# Patient Record
Sex: MALE | Race: WHITE | NOT HISPANIC OR LATINO | Employment: STUDENT | URBAN - METROPOLITAN AREA
[De-identification: names, ages, dates, MRNs, and addresses within clinical notes are randomized per-mention and may not be internally consistent; named-entity substitution may affect disease eponyms.]

---
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
## 2025-06-16 ENCOUNTER — OFFICE VISIT (OUTPATIENT)
Dept: INTERNAL MEDICINE | Facility: CLINIC | Age: 20
End: 2025-06-16
Payer: COMMERCIAL

## 2025-06-16 ENCOUNTER — LAB VISIT (OUTPATIENT)
Dept: LAB | Facility: OTHER | Age: 20
End: 2025-06-16
Attending: INTERNAL MEDICINE
Payer: COMMERCIAL

## 2025-06-16 VITALS
BODY MASS INDEX: 21.91 KG/M2 | HEART RATE: 73 BPM | WEIGHT: 156.5 LBS | HEIGHT: 71 IN | OXYGEN SATURATION: 100 % | SYSTOLIC BLOOD PRESSURE: 142 MMHG | DIASTOLIC BLOOD PRESSURE: 80 MMHG

## 2025-06-16 DIAGNOSIS — R11.0 NAUSEA: ICD-10-CM

## 2025-06-16 DIAGNOSIS — R63.4 WEIGHT LOSS: ICD-10-CM

## 2025-06-16 DIAGNOSIS — R03.0 ELEVATED BLOOD PRESSURE READING: ICD-10-CM

## 2025-06-16 DIAGNOSIS — K29.70 GASTRITIS WITHOUT BLEEDING, UNSPECIFIED CHRONICITY, UNSPECIFIED GASTRITIS TYPE: ICD-10-CM

## 2025-06-16 DIAGNOSIS — R10.13 EPIGASTRIC PAIN: Primary | ICD-10-CM

## 2025-06-16 DIAGNOSIS — R11.14 BILIOUS VOMITING WITH NAUSEA: ICD-10-CM

## 2025-06-16 DIAGNOSIS — R10.13 EPIGASTRIC PAIN: ICD-10-CM

## 2025-06-16 LAB
ABSOLUTE EOSINOPHIL (OHS): 0.08 K/UL
ABSOLUTE MONOCYTE (OHS): 0.41 K/UL (ref 0.3–1)
ABSOLUTE NEUTROPHIL COUNT (OHS): 4.28 K/UL (ref 1.8–7.7)
ALBUMIN SERPL BCP-MCNC: 4.7 G/DL (ref 3.5–5.2)
ALP SERPL-CCNC: 94 UNIT/L (ref 40–150)
ALT SERPL W/O P-5'-P-CCNC: 16 UNIT/L (ref 10–44)
AMYLASE SERPL-CCNC: 44 UNIT/L (ref 20–110)
ANION GAP (OHS): 12 MMOL/L (ref 8–16)
AST SERPL-CCNC: 17 UNIT/L (ref 11–45)
BASOPHILS # BLD AUTO: 0.04 K/UL
BASOPHILS NFR BLD AUTO: 0.7 %
BILIRUB SERPL-MCNC: 0.8 MG/DL (ref 0.1–1)
BUN SERPL-MCNC: 14 MG/DL (ref 6–20)
CALCIUM SERPL-MCNC: 10 MG/DL (ref 8.7–10.5)
CHLORIDE SERPL-SCNC: 102 MMOL/L (ref 95–110)
CO2 SERPL-SCNC: 24 MMOL/L (ref 23–29)
CREAT SERPL-MCNC: 1.1 MG/DL (ref 0.5–1.4)
CRP SERPL-MCNC: <0.1 MG/L
ERYTHROCYTE [DISTWIDTH] IN BLOOD BY AUTOMATED COUNT: 11.7 % (ref 11.5–14.5)
GFR SERPLBLD CREATININE-BSD FMLA CKD-EPI: >60 ML/MIN/1.73/M2
GLUCOSE SERPL-MCNC: 95 MG/DL (ref 70–110)
HCT VFR BLD AUTO: 48.3 % (ref 40–54)
HGB BLD-MCNC: 16.7 GM/DL (ref 14–18)
IMM GRANULOCYTES # BLD AUTO: 0.01 K/UL (ref 0–0.04)
IMM GRANULOCYTES NFR BLD AUTO: 0.2 % (ref 0–0.5)
LIPASE SERPL-CCNC: 14 U/L (ref 4–60)
LYMPHOCYTES # BLD AUTO: 1.16 K/UL (ref 1–4.8)
MCH RBC QN AUTO: 29.9 PG (ref 27–31)
MCHC RBC AUTO-ENTMCNC: 34.6 G/DL (ref 32–36)
MCV RBC AUTO: 87 FL (ref 82–98)
NUCLEATED RBC (/100WBC) (OHS): 0 /100 WBC
PLATELET # BLD AUTO: 273 K/UL (ref 150–450)
PMV BLD AUTO: 10.5 FL (ref 9.2–12.9)
POTASSIUM SERPL-SCNC: 4.4 MMOL/L (ref 3.5–5.1)
PROT SERPL-MCNC: 8.3 GM/DL (ref 6–8.4)
RBC # BLD AUTO: 5.58 M/UL (ref 4.6–6.2)
RELATIVE EOSINOPHIL (OHS): 1.3 %
RELATIVE LYMPHOCYTE (OHS): 19.4 % (ref 18–48)
RELATIVE MONOCYTE (OHS): 6.9 % (ref 4–15)
RELATIVE NEUTROPHIL (OHS): 71.5 % (ref 38–73)
SODIUM SERPL-SCNC: 138 MMOL/L (ref 136–145)
T4 FREE SERPL-MCNC: 1.25 NG/DL (ref 0.71–1.51)
WBC # BLD AUTO: 5.98 K/UL (ref 3.9–12.7)

## 2025-06-16 PROCEDURE — 80053 COMPREHEN METABOLIC PANEL: CPT

## 2025-06-16 PROCEDURE — 84439 ASSAY OF FREE THYROXINE: CPT

## 2025-06-16 PROCEDURE — 1160F RVW MEDS BY RX/DR IN RCRD: CPT | Mod: CPTII,S$GLB,, | Performed by: INTERNAL MEDICINE

## 2025-06-16 PROCEDURE — 85025 COMPLETE CBC W/AUTO DIFF WBC: CPT

## 2025-06-16 PROCEDURE — 99999 PR PBB SHADOW E&M-EST. PATIENT-LVL III: CPT | Mod: PBBFAC,,, | Performed by: INTERNAL MEDICINE

## 2025-06-16 PROCEDURE — 36415 COLL VENOUS BLD VENIPUNCTURE: CPT

## 2025-06-16 PROCEDURE — 3077F SYST BP >= 140 MM HG: CPT | Mod: CPTII,S$GLB,, | Performed by: INTERNAL MEDICINE

## 2025-06-16 PROCEDURE — 3079F DIAST BP 80-89 MM HG: CPT | Mod: CPTII,S$GLB,, | Performed by: INTERNAL MEDICINE

## 2025-06-16 PROCEDURE — 3008F BODY MASS INDEX DOCD: CPT | Mod: CPTII,S$GLB,, | Performed by: INTERNAL MEDICINE

## 2025-06-16 PROCEDURE — 99204 OFFICE O/P NEW MOD 45 MIN: CPT | Mod: S$GLB,,, | Performed by: INTERNAL MEDICINE

## 2025-06-16 PROCEDURE — 82150 ASSAY OF AMYLASE: CPT

## 2025-06-16 PROCEDURE — 83690 ASSAY OF LIPASE: CPT

## 2025-06-16 PROCEDURE — 86140 C-REACTIVE PROTEIN: CPT

## 2025-06-16 PROCEDURE — 1159F MED LIST DOCD IN RCRD: CPT | Mod: CPTII,S$GLB,, | Performed by: INTERNAL MEDICINE

## 2025-06-16 RX ORDER — OMEPRAZOLE 40 MG/1
40 CAPSULE, DELAYED RELEASE ORAL DAILY
Qty: 30 CAPSULE | Refills: 0 | Status: SHIPPED | OUTPATIENT
Start: 2025-06-16 | End: 2025-07-16

## 2025-06-16 NOTE — PROGRESS NOTES
Subjective:      Patient ID: Rohit Manning is a 19 y.o. male.    Chief Complaint: Abdominal Pain and Emesis      History of Present Illness    CHIEF COMPLAINT:  Patient presents with abdominal pain.    HISTORY OF PRESENT ILLNESS:  The patient is a 19-year-old male who presents to urgent care today accompanied by his father, reporting abdominal pain that began Tuesday night. Initially, the pain was generalized but has since localized to the lower abdomen. Last night, the pain peaked at 7/10 in intensity. He describes a sensation of congestion or pressure in the affected area.  He reports one episode of vomiting bile, without any food content. He denies fever, chills, diarrhea, or hematochezia. There is no report of tachycardia or diaphoresis. On Friday, he experienced acid reflux characterized by a burning sensation in the chest. He had constipation on Friday and Saturday, which resolved by Sunday. He denies palpitations, visual changes, anxiety, depression, insomnia, or other psychiatric symptoms.  Dietary history includes the consumption of fried food while at camp. Since Thursday, he has switched to a plain diet. Notably, symptoms began around 4:00 a.m. yesterday following the ingestion of a barbecue pulled pork sandwich and baked potato.  A recent urgent care visit included a normal abdominal X-ray and CBC. He was treated with antiemetic and antispasmodic medications, which provided partial relief. Additionally, he reports unintentional weight loss of approximately 15-20 pounds over the past two years, which he believes exceeds expected muscle mass loss.  He denies alcohol, tobacco, marijuana, or other illicit drug use.    REVIEW OF SYSTEMS (Pertinent Positives & Negatives):  GI: Abdominal pain, bile vomiting, acid reflux, resolved constipation  Constitutional: Reports unintentional weight loss  Cardiovascular: Denies palpitations or tachycardia  Neurologic: No visual disturbances  Psychiatric: Denies  "anxiety, depression, insomnia  Other: Denies fever, chills, diarrhea, hematochezia, or sweating    VITAL SIGNS:  Blood Pressure: 122/80 mmHg  Pulse: 73 bpm  Temperature: 98.7°F    MEDICAL RECORD REVIEW:  I reviewed previous urgent care records, including laboratory results, imaging, and prescribed medications. The review took approximately 10 minutes.  ROS:  General: -fever, -chills, -fatigue, -weight gain, +weight loss  Eyes: -vision changes, -redness, -discharge  ENT: -ear pain, -nasal congestion, -sore throat  Cardiovascular: -chest pain, -palpitations, -lower extremity edema  Respiratory: -cough, -shortness of breath  Gastrointestinal: +abdominal pain, -nausea, +vomiting, -diarrhea, +constipation, -blood in stool, +heartburn  Genitourinary: -dysuria, -hematuria, -frequency  Musculoskeletal: -joint pain, -muscle pain  Skin: -rash, -lesion  Neurological: -headache, -dizziness, -numbness, -tingling  Psychiatric: -anxiety, -depression, -sleep difficulty         There are no active problems to display for this patient.       MEDICATIONS:  Current Medications[1]  as of 6/16/2025 10:32 AM     Current Outpatient Medications:     omeprazole (PRILOSEC) 40 MG capsule, Take 1 capsule (40 mg total) by mouth once daily., Disp: 30 capsule, Rfl: 0  ALLERGIES:  Review of patient's allergies indicates:  No Known Allergies     History reviewed. No pertinent past medical history.  History reviewed. No pertinent surgical history.  Social History     Social History Narrative    Lives with mom, dad, sister, and brother.        No pets        Attends [employer]     No family history on file.    Vitals:    06/16/25 1005   BP: (!) 142/80   Pulse: 73   SpO2: 100%   Weight: 71 kg (156 lb 8.4 oz)   Height: 5' 11" (1.803 m)   PainSc:   2   PainLoc: Abdomen       Review of Systems     No results found for: "HGBA1C"  No results found for: "MICALBCREAT"  Lab Results   Component Value Date    LDLCALC 85.8 11/29/2018    CHOL 159 " "11/29/2018    HDL 63 11/29/2018    TRIG 51 11/29/2018       Lab Results   Component Value Date    WBC 5.29 11/29/2018    HGB 13.6 11/29/2018    HCT 42.1 11/29/2018    MCV 86 11/29/2018     11/29/2018       No results found for: "LH", "FSH", "TOTALTESTOST", "PROGESTERONE", "ESTRADIOL", "ANATHJKQ19HC", "BYJAQYAF40", "FERRITIN", "IRON", "TRANSFERRIN", "TIBC", "FESATURATED", "ZINC"    Objective:   Physical Exam   Physical Exam    Vitals: Weight: 156 lbs.  General: No acute distress. Well-developed. Well-nourished.  Eyes: EOMI. Sclerae anicteric.  HENT: Normocephalic. Atraumatic. Nares patent. Moist oral mucosa.  Ears: Bilateral TMs clear. Bilateral EACs clear.  Cardiovascular: Regular rate. Regular rhythm. No murmurs. No rubs. No gallops. Normal S1, S2.  Respiratory: Normal respiratory effort. Clear to auscultation bilaterally. No rales. No rhonchi. No wheezing.  Abdomen: Soft. Non-tender. Non-distended. Normoactive bowel sounds.  Musculoskeletal: No  obvious deformity.  Extremities: No lower extremity edema.  Neurological: Alert & oriented x3. No slurred speech. Normal gait.  Psychiatric: Normal mood. Normal affect. Good insight. Good judgment.  Skin: Warm. Dry. No rash.         Assessment:     1. Epigastric pain    2. Nausea    3. Weight loss    4. Gastritis without bleeding, unspecified chronicity, unspecified gastritis type    5. Bilious vomiting with nausea    6. Elevated blood pressure reading      Plan:   Assessment & Plan   Epigastric pain  -     CBC Auto Differential; Future; Expected date: 06/16/2025  - improved  - advised the patient taking Prilosec 40 mg daily for 1-2 weeks  Nausea/vomiting  -     Comprehensive Metabolic Panel; Future; Expected date: 06/16/2025  - basically resolved, continue taking Phenergan as needed  - advised the patient drink more warm liquid in it easy digested food for 1 week  Weight loss  -     C-Reactive Protein; Future; Expected date: 06/16/2025  -     T4, Free; Future; " Expected date: 06/16/2025    Gastritis without bleeding, unspecified chronicity, unspecified gastritis type  -   prescribed and taking  omeprazole (PRILOSEC) 40 MG capsule; Take 1 capsule (40 mg total) by mouth once daily.  Dispense: 30 capsule; Refill: 0  Elevated blood pressure level  - it may associated with abdominal pain and nausea and vomiting  - advised the patient taking low-salt diet and check blood pressure at home.     Assessment & Plan    A05.8 Other specified bacterial foodborne intoxications  K21.9 Gastro-esophageal reflux disease without esophagitis  R63.4 Abnormal weight loss  R10.9 Unspecified abdominal pain  R11.14 Bilious vomiting    IMPRESSION:  Assessed symptoms and history, considering food poisoning as likely cause of abdominal pain and vomiting.  Reviewed labs and XR results from urgent care, noting normal white count and no signs of bacterial infection or anemia.  Ruled out gallbladder issues based on previous exam and imaging.  Considered possibility of superficial gastritis due to irritated stomach.  Noted significant weight loss (approximately 20 lbs) since last recorded weight, attributed to reduced workout routine at [place].  Condition is improving, with pain moving lower in abdomen and decreasing in intensity.    BACTERIAL FOODBORNE INTOXICATION (FOOD POISONING):  - Evaluated and suspected food poisoning, possibly from BBQ or undercooked meat, causing superficial gastritis.  - Monitored the patient's abdominal pain which started Tuesday night, with vomiting of bile but no diarrhea.  - Recent tests show no bacterial infections.  - Explained that food poisoning can last for several days, even without diarrhea.  - Advised the patient to eat easily digestible foods and recommended limiting intake of cheese and milk while recovering.    GASTROESOPHAGEAL REFLUX DISEASE (GERD):  - Monitored the patient's occasional acid reflux, with burning sensation on Friday.  - Started prescription antacid  "medication, to be taken daily for 1 week.  - Advised the patient to continue taking antacid medications for a few more days and instructed to avoid consuming large meals.    ABNORMAL WEIGHT LOSS:  - Monitored the patient's weight loss from 172 lbs to 156 lbs over a month.  - Evaluated significant weight loss and considered it could be due to reduced workout or dietary changes.  - Planned to conduct labs to investigate the cause of weight loss to ensure it is not due to underlying issues.  - Instructed the patient to contact the office for blood test results if performed.    ABDOMINAL PAIN:  - Monitored the patient's abdominal pain starting Tuesday night, initially in the abdomen and later lower down.  - Noted tenderness in the abdomen upon exam.  - Prescribed Scopolamine for abdominal pain and continued previously prescribed medications for pain and nausea.  - Discussed that cold water can cause stomach spasms and increase pain.    BILIOUS VOMITING:  - Monitored the patient's vomiting of bile but not food, with no diarrhea.  - Considered this could be related to food poisoning or gastritis.  - Prescribed antacids and continued previously prescribed medications for nausea.  - Advised patient to drink plenty of warm water.    FOLLOW-UP:  - Instructed to follow up if symptoms worsen or do not improve.         Orders Placed This Encounter    CBC Auto Differential    Comprehensive Metabolic Panel    C-Reactive Protein    T4, Free    Amylase    Lipase    omeprazole (PRILOSEC) 40 MG capsule       Follow up if symptoms worsen or fail to improve.     There are  Patient Instructions on file for this visit.  [unfilled]   All of your core healthy metrics are met.  Patient Education     Gastritis   The Basics   Written by the doctors and editors at [facility]   What is gastritis? -- "Gastritis" means inflammation of the stomach lining (figure 1).  Some people have gastritis that starts suddenly and lasts only for a short time. " "Doctors call this "acute" gastritis. Other people have gastritis that lasts for months or years. Doctors call this "chronic" gastritis.  What causes gastritis? -- Different things can cause gastritis, including:   An infection in the stomach from bacteria called H. pylori   Medicines called "nonsteroidal antiinflammatory drugs" ("NSAIDs") - These include aspirin, ibuprofen (brand names: Advil, Motrin), and naproxen (brand names: Aleve, Naprosyn).   Drinking alcohol   Conditions in which the body's infection-fighting system attacks the stomach lining   Having a serious or life-threatening illness  What are the symptoms of gastritis? -- Gastritis itself does not cause symptoms. When people do have symptoms, they are due to other conditions that can happen with gastritis, like ulcers. Symptoms from ulcers include:   Pain in the upper belly   Feeling bloated, or feeling full after eating a small amount of food   Decreased appetite   Nausea or vomiting   Vomiting blood, or having black-colored bowel movements   Feeling more tired than usual - This happens if people with gastritis get a condition called "anemia."  Will I need tests? -- Probably. Your doctor or nurse will ask about your symptoms and do an exam. They might also do:   Upper endoscopy - During this procedure, the doctor puts a thin tube with a camera on the end into your mouth and down into your stomach (figure 2). They look at the inside of your stomach. During the procedure, they might also do a test called a biopsy. For a biopsy, the doctor takes a small sample of the stomach lining. Then, another doctor looks at the sample under a microscope.   Tests to check for H. pylori infection. These can include:   Blood tests   Breath tests - These tests measure substances in your breath after you drink a special liquid.   Tests on a small sample of your bowel movement   Blood tests to check for anemia  Is there anything I can do on my own? -- Maybe. Your doctor " might recommend changes depending on what is causing your gastritis. For example:   If NSAIDs are the cause, they will recommend that you avoid these medicines.   If alcohol is the cause, they will recommend that you stop drinking alcohol.   There is no specific diet that has been proven to help people with gastritis.  How is gastritis treated? -- Doctors can use medicines to treat gastritis caused by an H. pylori infection. Most people take 3 or more medicines for 2 weeks. The treatment includes antibiotics plus medicine that helps the stomach make less acid.  Doctors can use medicines that reduce or block stomach acid to treat other causes of gastritis (table 1). The main types of medicines that reduce or block stomach acid are:   Antacids   Surface agents   Histamine blockers   Proton pump inhibitors  If your doctor recommends acid-reducing treatment, they will tell you which medicine to use.  What happens after treatment? -- Sometimes, people who are treated for an H. pylori infection need follow-up tests to make sure that the infection is gone. Follow-up tests include breath tests, lab tests on a sample of bowel movement, or endoscopy.  Should I call my doctor or nurse? -- Call your doctor or nurse if:   You have belly pain that gets worse or doesn't go away.   You vomit blood or have black bowel movements.   You are losing weight (without trying to).  All topics are updated as new evidence becomes available and our peer review process is complete.  This topic retrieved from [employer] on: May 30, 2024.  Topic 89375 Version 14.0  Release: 32.5.3 - C32.150  © 2024 UpToDate, Inc. and/or its affiliates. All rights reserved.  figure 1: Upper digestive tract     The upper digestive tract includes the esophagus (the tube that connects the mouth to the stomach), the stomach, and the duodenum (the first part of the small intestine).  Graphic 38648 Version 6.0  figure 2: Upper endoscopy     During an upper endoscopy, you  lie down and the doctor puts a thin tube with a camera and light on the end (called an endoscope) into your mouth and down into your esophagus, stomach, and duodenum (the first part of your small intestine). The camera sends pictures from inside your body to a television screen. That way, your doctor can see the inside of your esophagus, stomach, and duodenum.  Graphic 57662 Version 4.0  table 1: Medicines that reduce or block stomach acid  Medicine type  Medicine name examples    Antacids* Calcium carbonate (sample brand names: Maalox, Tums)    Aluminum hydroxide, magnesium hydroxide, and simethicone (sample brand name: Mylanta)   Surface agents Sucralfate (brand name: Carafate)   Histamine blockers¶  Famotidine (brand name: Pepcid)    Cimetidine (brand name: Tagamet)   Proton pump inhibitors Omeprazole (brand name: Prilosec)    Esomeprazole (brand name: Nexium)    Pantoprazole (brand name: Protonix)    Lansoprazole (brand name: Prevacid)    Dexlansoprazole (brand name: Dexilant)    Rabeprazole (brand name: AcipHex)   * Some antacids contain aspirin, which can increase the risk of internal bleeding. Examples of antacids with aspirin include Danita-Haugan, Medi-Haugan, and Neutralin. But there are others, too, so it's important to check labels. Talk to your doctor or nurse before taking any medicines that contain aspirin.  ¶ Another histamine blocker, ranitidine (brand name: Zantac), stopped being sold in 2020. That's because it was found to sometimes contain a substance that could increase a person's risk of cancer if they took a lot of it over time. If you have any of this medicine in your home, stop taking it and throw away any that is left over. Ask your doctor or nurse about what other medicine you should use instead.  Graphic 01240 Version 15.0  Consumer Information Use and Disclaimer   Disclaimer: This generalized information is a limited summary of diagnosis, treatment, and/or medication information. It is  not meant to be comprehensive and should be used as a tool to help the user understand and/or assess potential diagnostic and treatment options. It does NOT include all information about conditions, treatments, medications, side effects, or risks that may apply to a specific patient. It is not intended to be medical advice or a substitute for the medical advice, diagnosis, or treatment of a health care provider based on the health care provider's examination and assessment of a patient's specific and unique circumstances. Patients must speak with a health care provider for complete information about their health, medical questions, and treatment options, including any risks or benefits regarding use of medications. This information does not endorse any treatments or medications as safe, effective, or approved for treating a specific patient. UpToDate, Inc. and its affiliates disclaim any warranty or liability relating to this information or the use thereof.The use of this information is governed by the Terms of Use, available at https://www.[employer].Dasient/en/know/clinical-effectiveness-terms. 2024© UpToDate, Inc. and its affiliates and/or licensors. All rights reserved.  Copyright   © 2024 UpToDate, Inc. and/or its affiliates. All rights reserved.    Visit Checklist (as applicable):  1. Status of new and prior symptoms discussed? yes  2. Imaging reviewed/ ordered as appropriate? yes  3. Lab study reviewed/ ordered as appropriate? yes  4. Plan for work-up and treatment discussed with patient? yes  5. Potential medication side-effects and monitoring plan discussed? yes  6. Review of outside medical records was performed and pertinent details are summarized in the HPI above? yes     Time spent on this encounter: 30 minutes. This includes face to face time and non-face to face time preparing to see the patient (eg, review of tests), obtaining and/or reviewing separately obtained history, documenting clinical  information in the electronic or other health record, independently interpreting results (not separately reported) and communicating results to the patient/family/caregiver, or care coordination (not separately reported). Also patient education regarding chronic and acute medical conditions reviewed. Patient handouts given if appropriate.     Each patient to whom he or she provides medical services by telemedicine is:  (1) informed of the relationship between the physician and patient and the respective role of any other health care provider with respect to management of the patient; and (2) notified that he or she may decline to receive medical services by telemedicine and may withdraw from such care at any time.     This note was generated with the assistance of ambient listening technology. Verbal consent was obtained by the patient and accompanying visitor(s) for the recording of patient appointment to facilitate this note. I attest to having reviewed and edited the generated note for accuracy, though some syntax or spelling errors may persist. Please contact the author of this note for any clarification.       Sathya Dukes MD  [facility]                               [1]   Current Outpatient Medications:     omeprazole (PRILOSEC) 40 MG capsule, Take 1 capsule (40 mg total) by mouth once daily., Disp: 30 capsule, Rfl: 0

## 2025-06-17 ENCOUNTER — RESULTS FOLLOW-UP (OUTPATIENT)
Dept: INTERNAL MEDICINE | Facility: CLINIC | Age: 20
End: 2025-06-17